# Patient Record
Sex: MALE | Race: WHITE | NOT HISPANIC OR LATINO | Employment: FULL TIME | URBAN - METROPOLITAN AREA
[De-identification: names, ages, dates, MRNs, and addresses within clinical notes are randomized per-mention and may not be internally consistent; named-entity substitution may affect disease eponyms.]

---

## 2019-03-15 ENCOUNTER — TELEPHONE (OUTPATIENT)
Dept: PHYSICAL THERAPY | Facility: CLINIC | Age: 24
End: 2019-03-15

## 2019-03-19 ENCOUNTER — OFFICE VISIT (OUTPATIENT)
Dept: PHYSICAL THERAPY | Facility: CLINIC | Age: 24
End: 2019-03-19
Payer: COMMERCIAL

## 2019-03-19 DIAGNOSIS — R20.2 PARESTHESIA OF THUMB OF LEFT HAND: Primary | ICD-10-CM

## 2019-03-19 PROCEDURE — G8984 CARRY CURRENT STATUS: HCPCS

## 2019-03-19 PROCEDURE — G8985 CARRY GOAL STATUS: HCPCS

## 2019-03-19 PROCEDURE — 97110 THERAPEUTIC EXERCISES: CPT

## 2019-03-19 NOTE — PROGRESS NOTES
PT Evaluation     Today's date: 3/19/19  Patient name: Alisson Yousif  : 1995  MRN: [id number]  Referring provider: Self, Referral  Dx:   Encounter Diagnosis     ICD-10-CM    1  Paresthesia of thumb of left hand R20 2        Start Time: 1605  Stop Time: 1645  Total time in clinic (min): 40 minutes    Assessment  Assessment details: Patient presents today with L thumb numbness that began after bowling 10 consecutive games with a new bevel that he believes did not fit his thumb appropriately  No pain is present, but N/T present at the tip of the thumb and worsens If he squeezes his web spaces  PT palpation noted increase in TTP and hypersensitivty of the distal thumb and the DIP  Reduced strength noted of the EPL and the FPL with MMTing  Reduced  strength below age matched norm which is 110 5 lbs  Wrist ROM WNL but reduced thumb ROM to below WNL for flexion of the DIP and radial abduction  (-) Tinel's sign present as well as (-) froment's sign  Patient was unable to keep his palm flat on table and extend his thumb off of the table indicating weakness of EPL BL  Patient was thoroughly educated on completing desensitization techniques at home and he verbalized understanding  Abnormal functional biomechanical forces noted with pinching with hyperextension of thumb  Patient would benefit from skilled PT in order to reduce hypersensitivity of thumb, reduce N/T present in thumb, address ROM restrictions, and improve strength of BL UE's so he can complete ADLs and household chores requiring his UE's, maintain a strong , improve biomechanical forces with functional tasks , and bowl symptom free  Impairments: abnormal or restricted ROM, activity intolerance, impaired physical strength, lacks appropriate home exercise program, pain with function and poor body mechanics    Symptom irritability: lowUnderstanding of Dx/Px/POC: good  Goals  STGs:  1   Reduce hypersensitivity by 50% to improve ability to use thumb with function  2  Improve  strength by 2-5 lbs to reach WNL for his age group  3  Improve strength of EPL so he can lift his thumb off table   LTG's  1  Improve hypersensitivity by 80% or greater so he can bowl without aggravating symptoms  2  Improve  strength to 110 5 lbs which his his age matched norm  3  Improve ROM of IP flexion to improve his biomechanical forces with pinching     Plan  Patient would benefit from: PT eval and skilled physical therapy  Planned modality interventions: cryotherapy, TENS and thermotherapy: hydrocollator packs  Planned therapy interventions: joint mobilization, manual therapy, neuromuscular re-education, patient education, strengthening, stretching, therapeutic exercise, therapeutic activities, graded motor, graded exercise, home exercise program, functional ROM exercises, flexibility and fine motor coordination training  Frequency: 2x week  Duration in weeks: 6  Plan of Care beginning date: 3/20/2019  Plan of Care expiration date: 5/15/2019  Treatment plan discussed with: patient        Subjective Evaluation    History of Present Illness  Mechanism of injury: Patient reports that 2 5 weeks ago he was bowling and got a new thumb  The bevel on the bowling bowl was too tight, and believes that after playing 10 consecutive games he pinched a nerve  He hasnt bowled in a week & a half  It has gone down but it is still present  The numbness is only located in the tip of the thumb unless he pinches his web space and can feel it into his whole thumn  He reports no pain just NT in the thumb  No Hx of trauma in the hand  He reports that he has tried ice and heat, as well as advil     Pain  Current pain ratin  At best pain ratin  At worst pain ratin  Location: in the tip of the thumb on the L  Quality: needle-like  Relieving factors: ice and heat    Social Support    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)  Hand dominance: left      Diagnostic Tests  No diagnostic tests performed  Treatments  Current treatment: physical therapy  Patient Goals  Patient goals for therapy: increased strength, independence with ADLs/IADLs, return to sport/leisure activities and improved balance  Patient goal: reduce NT in the thumb  Objective     Palpation     Additional Palpation Details  TTP and hypersensitivity at the L distal thumb and IP joint  Active Range of Motion     Left Wrist   Wrist flexion: 60 degrees   Wrist extension: 70 degrees     Right Wrist   Wrist flexion: 60 degrees   Wrist extension: 70 degrees     Left Thumb   Flexion     MP: 50 degrees    DIP: 45 degrees  Palmar Abduction     CMC: 50 degrees  Radial abduction    CMC: 35 degrees    Right Thumb   Flexion     MP: 50    DIP: 40  Palmar Abduction    CMC: 50  Radial Abduction    CMC: 30    Strength/Myotome Testing     Left Wrist/Hand      (2nd hand position)     Trial 1: 100    Right Wrist/Hand      (2nd hand position)     Trial 1: 100    Tests     Left Shoulder   Negative ULTT1, ULTT2, ULTT3 and ULTT4  Right Shoulder   Negative ULTT1, ULTT2, ULTT3 and ULTT4  Left Elbow   Negative Tinel's sign (cubital tunnel)  Right Elbow   Negative Tinel's sign (cubital tunnel)  Left Wrist/Hand   Negative Tinel's sign (medial nerve)  Right Wrist/Hand   Negative Tinel's sign (medial nerve)       Additional Tests Details  EPL: 3-/5 BL  Froment's sign: (-) BL     Swelling     Left Wrist/Hand   Thumb     Proximal: 6 3 cm    Distal: 5 9 cm    Right Wrist/Hand   Thumb     Proximal: 6 3 cm    Distal: 5 9 cm      Flowsheet Rows      Most Recent Value   PT/OT G-Codes   Current Score  65   Projected Score  81   FOTO information reviewed  Yes   Assessment Type  Evaluation   G code set  Carrying, Moving & Handling Objects   Carrying, Moving and Handling Objects Current Status ()  CI   Carrying, Moving and Handling Objects Goal Status ()  CI Precautions: Standard    Daily Treatment Diary     Manual                                                                                   Exercise Diary  3/20/19            Patient education Nerve impingement, HEP: desensitization                                                                                                                                                                                                                                                                          Modalities

## 2019-03-20 ENCOUNTER — OFFICE VISIT (OUTPATIENT)
Dept: PHYSICAL THERAPY | Facility: CLINIC | Age: 24
End: 2019-03-20
Payer: COMMERCIAL

## 2019-03-20 DIAGNOSIS — R20.2 PARESTHESIA OF THUMB OF LEFT HAND: Primary | ICD-10-CM

## 2019-03-20 PROCEDURE — 97110 THERAPEUTIC EXERCISES: CPT

## 2019-03-20 PROCEDURE — 97161 PT EVAL LOW COMPLEX 20 MIN: CPT

## 2019-03-20 PROCEDURE — 97140 MANUAL THERAPY 1/> REGIONS: CPT

## 2019-03-20 NOTE — PROGRESS NOTES
Daily Note     Today's date: 3/20/2019  Patient name: Stanislaw Ahuja  : 1995  MRN: [id number]  Referring provider: Self, Referral  Dx:   Encounter Diagnosis     ICD-10-CM    1  Paresthesia of thumb of left hand R20 2        Start Time:   Stop Time:   Total time in clinic (min): 60 minutes    Subjective: Patient reports that he hasnt noticed changes since beginning de sensitization  Objective: See treatment diary below    Precautions: Standard    Daily Treatment Diary     Manual  3/20/19            STM intrinsics, thenar eminenece, hypothenar eminence, lumbricals, wrist extrinsics            IASTM intrinsics, thenar eminenece, hypothenar eminence, lumbricals, wrist extrinsics, web spaces            Blocking Thumb IP: 2 x 10  Thumb MCP: 2 x 10             Intrinsic Stretching 3 x 30" each digit            Desensitization Gauze material, towel, hard surface                Exercise Diary  3/20/19            Patient education Nerve impingement, HEP, desenstization            Putty: Thumb Flexion 1  X 10             Putty: Thumb adduction  1 x 10            Putty; Thumb Abduction 1 x 10            Putty: Pinch & Pull 2'            Putty: roll and pinch 2'            Towel Scrunches 2'            Thumb Opposition 2 x 10                                                                                                                                                                             Modalities  3/20/19            MHP 8' pre tx to L hand: skin in tact pre and post            Cold Pack 8' post tx to L hand: skin in tact pre and post                           Assessment: Tolerated treatment well  Patient thoroughly educated in completing HEP (see attached doc), which he demonstrated how to complete correctly and safely  He also verbalized understanding  Tolerated exercises well today to improve strength of L UE and promote nerve desensitization and address compressed nerve   Reduction in symptoms noted post tx which reduced NT present compared to arrival       Plan: Continue per plan of care

## 2019-03-25 ENCOUNTER — APPOINTMENT (OUTPATIENT)
Dept: PHYSICAL THERAPY | Facility: CLINIC | Age: 24
End: 2019-03-25
Payer: COMMERCIAL

## 2019-03-27 ENCOUNTER — APPOINTMENT (OUTPATIENT)
Dept: PHYSICAL THERAPY | Facility: CLINIC | Age: 24
End: 2019-03-27
Payer: COMMERCIAL

## 2019-06-19 NOTE — PROGRESS NOTES
6/19/19: Patient did not show up to last 2 PT visits  FD called and LM to re-schedule but patient did not return call to reschedule  Considered self DC at this time

## 2021-07-12 ENCOUNTER — OFFICE VISIT (OUTPATIENT)
Dept: URGENT CARE | Facility: CLINIC | Age: 26
End: 2021-07-12
Payer: COMMERCIAL

## 2021-07-12 VITALS
RESPIRATION RATE: 18 BRPM | OXYGEN SATURATION: 98 % | HEART RATE: 58 BPM | TEMPERATURE: 97.9 F | SYSTOLIC BLOOD PRESSURE: 100 MMHG | WEIGHT: 171.6 LBS | BODY MASS INDEX: 24.57 KG/M2 | HEIGHT: 70 IN | DIASTOLIC BLOOD PRESSURE: 60 MMHG

## 2021-07-12 DIAGNOSIS — L23.7 ALLERGIC CONTACT DERMATITIS DUE TO PLANTS, EXCEPT FOOD: Primary | ICD-10-CM

## 2021-07-12 PROCEDURE — 99203 OFFICE O/P NEW LOW 30 MIN: CPT | Performed by: PHYSICIAN ASSISTANT

## 2021-07-12 RX ORDER — PREDNISONE 10 MG/1
TABLET ORAL
Qty: 21 TABLET | Refills: 0 | Status: SHIPPED | OUTPATIENT
Start: 2021-07-12

## 2021-07-12 NOTE — PATIENT INSTRUCTIONS
Diffuse rash consistent with poison ivy  Prescribed triamcinolone 1% ointment  Can apply to the rash area 2x a day for 14 days  Avoid putting ointment on sensitive areas such as the face, groin and armpits  Also, prescribed oral prednisone  Take as directed  Follow up with primary care doctor if symptoms worsen

## 2021-07-12 NOTE — PROGRESS NOTES
[facility]        NAME: Alverto Yuen is a 27 y o  male  : 1995    MRN: [id number]  DATE: 2021  TIME: 1:19 PM    Assessment and Plan   Allergic contact dermatitis due to plants, except food [L23 7]  1  Allergic contact dermatitis due to plants, except food  triamcinolone (KENALOG) 0 1 % ointment    predniSONE 10 mg tablet         Patient Instructions     Patient Instructions   Diffuse rash consistent with poison ivy  Prescribed triamcinolone 1% ointment  Can apply to the rash area 2x a day for 14 days  Avoid putting ointment on sensitive areas such as the face, groin and armpits  Also, prescribed oral prednisone  Take as directed  Follow up with primary care doctor if symptoms worsen  Follow up with PCP in 3-5 days  Proceed to  ER if symptoms worsen  Chief Complaint     Chief Complaint   Patient presents with   [address]     Exposed to poison ivy and sumac on Saturday - large areas on arms and legs with few spots on abdoman and R face  Tried Tech-Nu, Calamine, anti-itch spray and Benadryl  History of Present Illness       26 y/o male presents with poison ivy rash dispersed diffusely on his arms, legs, and a small spot on her right cheek x2 days  Pt notes trees covered in poison fell on his parent's power lines so he had to help move them  He has tried [employer], Calamine, anti-itch spray and benadryl with out much relief  Denies any trouble breathing, SOB, tongue/lip or throat swelling  Review of Systems   Review of Systems   Constitutional: Negative for fever  HENT: Negative for facial swelling, sore throat and trouble swallowing  Respiratory: Negative for cough, chest tightness and shortness of breath  Cardiovascular: Negative for chest pain  Skin: Positive for rash  Neurological: Negative for headaches           Current Medications       Current Outpatient Medications:     predniSONE 10 mg tablet, Take 6 tabs x1 day, 5 tabs x1 day, 4 tabs x1 day, 3 tabs x1 day, 2 tabs x1 day and 1 tab x1 day, Disp: 21 tablet, Rfl: 0    triamcinolone (KENALOG) 0 1 % ointment, Apply topically 2 (two) times a day for 14 days, Disp: 80 g, Rfl: 0    Current Allergies     Allergies as of 07/12/2021    (No Known Allergies)            The following portions of the patient's history were reviewed and updated as appropriate: allergies, current medications, past family history, past medical history, past social history, past surgical history and problem list      Past Medical History:   Diagnosis Date    Allergic rhinitis     IBS (irritable bowel syndrome)        Past Surgical History:   Procedure Laterality Date    NO PAST SURGERIES         Family History   Problem Relation Age of Onset    Diabetes Father     Cancer Maternal Grandmother     Cancer Paternal Grandfather          Medications have been verified  Objective   /60   Pulse 58   Temp 97 9 °F (36 6 °C)   Resp 18   Ht 5' 10" (1 778 m)   Wt 77 8 kg (171 lb 9 6 oz)   SpO2 98%   BMI 24 62 kg/m²        Physical Exam     Physical Exam  Vitals and nursing note reviewed  Constitutional:       Appearance: Normal appearance  HENT:      Head: Normocephalic and atraumatic  Mouth/Throat:      Mouth: Mucous membranes are moist       Pharynx: Oropharynx is clear  Eyes:      Extraocular Movements: Extraocular movements intact  Pupils: Pupils are equal, round, and reactive to light  Cardiovascular:      Rate and Rhythm: Normal rate and regular rhythm  Pulses: Normal pulses  Heart sounds: Normal heart sounds  Skin:     Findings: Erythema and rash (vesicular linear rash noted scattered b/l arms and legs) present  Neurological:      Mental Status: He is alert and oriented to person, place, and time

## 2023-08-23 ENCOUNTER — TELEPHONE (OUTPATIENT)
Age: 28
End: 2023-08-23

## 2023-08-23 ENCOUNTER — APPOINTMENT (EMERGENCY)
Dept: RADIOLOGY | Facility: HOSPITAL | Age: 28
End: 2023-08-23
Payer: COMMERCIAL

## 2023-08-23 ENCOUNTER — HOSPITAL ENCOUNTER (EMERGENCY)
Facility: HOSPITAL | Age: 28
Discharge: HOME | End: 2023-08-23
Attending: EMERGENCY MEDICINE
Payer: COMMERCIAL

## 2023-08-23 VITALS
BODY MASS INDEX: 24.14 KG/M2 | OXYGEN SATURATION: 98 % | DIASTOLIC BLOOD PRESSURE: 61 MMHG | WEIGHT: 168.21 LBS | HEART RATE: 67 BPM | SYSTOLIC BLOOD PRESSURE: 133 MMHG | RESPIRATION RATE: 16 BRPM | TEMPERATURE: 98.3 F

## 2023-08-23 DIAGNOSIS — K64.4 EXTERNAL HEMORRHOID: ICD-10-CM

## 2023-08-23 DIAGNOSIS — R33.8 ACUTE URINARY RETENTION: Primary | ICD-10-CM

## 2023-08-23 DIAGNOSIS — K59.00 CONSTIPATION: ICD-10-CM

## 2023-08-23 LAB
ALBUMIN SERPL BCP-MCNC: 4.5 G/DL (ref 3.5–5)
ALP SERPL-CCNC: 58 U/L (ref 34–104)
ALT SERPL W P-5'-P-CCNC: 34 U/L (ref 7–52)
ANION GAP SERPL CALCULATED.3IONS-SCNC: 6 MMOL/L
AST SERPL W P-5'-P-CCNC: 44 U/L (ref 13–39)
BASOPHILS # BLD AUTO: 0.05 THOUSANDS/ÂΜL (ref 0–0.1)
BASOPHILS NFR BLD AUTO: 1 % (ref 0–1)
BILIRUB SERPL-MCNC: 1.43 MG/DL (ref 0.2–1)
BILIRUB UR QL STRIP: NEGATIVE
BUN SERPL-MCNC: 22 MG/DL (ref 5–25)
CALCIUM SERPL-MCNC: 9.4 MG/DL (ref 8.4–10.2)
CHLORIDE SERPL-SCNC: 104 MMOL/L (ref 96–108)
CLARITY UR: CLEAR
CO2 SERPL-SCNC: 28 MMOL/L (ref 21–32)
COLOR UR: NORMAL
CREAT SERPL-MCNC: 1 MG/DL (ref 0.6–1.3)
EOSINOPHIL # BLD AUTO: 0.18 THOUSAND/ÂΜL (ref 0–0.61)
EOSINOPHIL NFR BLD AUTO: 2 % (ref 0–6)
ERYTHROCYTE [DISTWIDTH] IN BLOOD BY AUTOMATED COUNT: 12.5 % (ref 11.6–15.1)
GFR SERPL CREATININE-BSD FRML MDRD: 100 ML/MIN/1.73SQ M
GLUCOSE SERPL-MCNC: 88 MG/DL (ref 65–140)
GLUCOSE UR STRIP-MCNC: NEGATIVE MG/DL
HCT VFR BLD AUTO: 44.2 % (ref 36.5–49.3)
HGB BLD-MCNC: 15.1 G/DL (ref 12–17)
HGB UR QL STRIP.AUTO: NEGATIVE
IMM GRANULOCYTES # BLD AUTO: 0.02 THOUSAND/UL (ref 0–0.2)
IMM GRANULOCYTES NFR BLD AUTO: 0 % (ref 0–2)
KETONES UR STRIP-MCNC: NEGATIVE MG/DL
LEUKOCYTE ESTERASE UR QL STRIP: NEGATIVE
LYMPHOCYTES # BLD AUTO: 1.64 THOUSANDS/ÂΜL (ref 0.6–4.47)
LYMPHOCYTES NFR BLD AUTO: 21 % (ref 14–44)
MCH RBC QN AUTO: 32.8 PG (ref 26.8–34.3)
MCHC RBC AUTO-ENTMCNC: 34.2 G/DL (ref 31.4–37.4)
MCV RBC AUTO: 96 FL (ref 82–98)
MONOCYTES # BLD AUTO: 0.65 THOUSAND/ÂΜL (ref 0.17–1.22)
MONOCYTES NFR BLD AUTO: 8 % (ref 4–12)
NEUTROPHILS # BLD AUTO: 5.4 THOUSANDS/ÂΜL (ref 1.85–7.62)
NEUTS SEG NFR BLD AUTO: 68 % (ref 43–75)
NITRITE UR QL STRIP: NEGATIVE
NRBC BLD AUTO-RTO: 0 /100 WBCS
PH UR STRIP.AUTO: 6 [PH]
PLATELET # BLD AUTO: 225 THOUSANDS/UL (ref 149–390)
PMV BLD AUTO: 9.4 FL (ref 8.9–12.7)
POTASSIUM SERPL-SCNC: 3.9 MMOL/L (ref 3.5–5.3)
PROT SERPL-MCNC: 7.2 G/DL (ref 6.4–8.4)
PROT UR STRIP-MCNC: NEGATIVE MG/DL
RBC # BLD AUTO: 4.61 MILLION/UL (ref 3.88–5.62)
SODIUM SERPL-SCNC: 138 MMOL/L (ref 135–147)
SP GR UR STRIP.AUTO: 1.02 (ref 1–1.03)
UROBILINOGEN UR QL STRIP.AUTO: 0.2 E.U./DL
WBC # BLD AUTO: 7.94 THOUSAND/UL (ref 4.31–10.16)

## 2023-08-23 PROCEDURE — 87591 N.GONORRHOEAE DNA AMP PROB: CPT | Performed by: EMERGENCY MEDICINE

## 2023-08-23 PROCEDURE — 51702 INSERT TEMP BLADDER CATH: CPT | Performed by: EMERGENCY MEDICINE

## 2023-08-23 PROCEDURE — 81003 URINALYSIS AUTO W/O SCOPE: CPT | Performed by: EMERGENCY MEDICINE

## 2023-08-23 PROCEDURE — 74018 RADEX ABDOMEN 1 VIEW: CPT

## 2023-08-23 PROCEDURE — 87491 CHLMYD TRACH DNA AMP PROBE: CPT | Performed by: EMERGENCY MEDICINE

## 2023-08-23 PROCEDURE — 80053 COMPREHEN METABOLIC PANEL: CPT | Performed by: EMERGENCY MEDICINE

## 2023-08-23 PROCEDURE — 99284 EMERGENCY DEPT VISIT MOD MDM: CPT | Performed by: EMERGENCY MEDICINE

## 2023-08-23 PROCEDURE — 36415 COLL VENOUS BLD VENIPUNCTURE: CPT | Performed by: EMERGENCY MEDICINE

## 2023-08-23 PROCEDURE — 99283 EMERGENCY DEPT VISIT LOW MDM: CPT

## 2023-08-23 PROCEDURE — 85025 COMPLETE CBC W/AUTO DIFF WBC: CPT | Performed by: EMERGENCY MEDICINE

## 2023-08-23 RX ORDER — HYDROCORTISONE ACETATE 25 MG/1
25 SUPPOSITORY RECTAL 2 TIMES DAILY
Qty: 12 SUPPOSITORY | Refills: 0 | Status: SHIPPED | OUTPATIENT
Start: 2023-08-23

## 2023-08-23 RX ORDER — DOCUSATE SODIUM 100 MG/1
100 CAPSULE, LIQUID FILLED ORAL 2 TIMES DAILY
Qty: 40 CAPSULE | Refills: 0 | Status: SHIPPED | OUTPATIENT
Start: 2023-08-23 | End: 2023-09-12

## 2023-08-23 RX ORDER — POLYETHYLENE GLYCOL 3350 17 G/17G
17 POWDER, FOR SOLUTION ORAL 2 TIMES DAILY
Qty: 102 G | Refills: 0 | Status: SHIPPED | OUTPATIENT
Start: 2023-08-23 | End: 2023-08-26

## 2023-08-23 NOTE — TELEPHONE ENCOUNTER
New Patient    What is the reason for the patient’s appointment?:Patient called stating he is having frequency and he feels his bladder is not emptying. Patient is going more often. Started a few weeks ago. He has slight burning sometimes. What office location does the patient prefer?:[place].      Does patient have Imaging/Lab Results:    Have patient records been requested?:  If No, are the records showing in Epic:       INSURANCE:   Do we accept the patient's insurance or is the patient Self-Pay?:    Insurance Provider:[employer]  Plan Type/Number:   Member ID#:       HISTORY:   Has the patient had any previous Urologist(s)?:no     Was the patient seen in the ED?:no     Has the patient had any outside testing done?:no     Does the patient have a personal history of cancer?:no.

## 2023-08-23 NOTE — ED PROVIDER NOTES
History  Chief Complaint   Patient presents with   • Difficulty Urinating     States for past week and a half. Feeling like he has to force urine out. Some discomfort before urinating. Also c/o poss internal hemerrrhoids and stools have been thin, unsure if it is connected     Patient is a 56-year-old male that presents to the emergency department for evaluation of dysuria/urinary frequency and retention. Patient states that he has had difficulty with urination for the past 1.5wks. He also had 15 voiding episodes yesterday. Patient states that he is in a monogamous relationship with his wife and is not concerned about STDs. History provided by:  Patient   used: No    Difficulty Urinating  Presenting symptoms: dysuria    Associated symptoms: urinary frequency    Associated symptoms: no abdominal pain, no diarrhea, no fever, no flank pain, no hematuria, no nausea and no vomiting        Prior to Admission Medications   Prescriptions Last Dose Informant Patient Reported? Taking?   predniSONE 10 mg tablet Not Taking  No No   Sig: Take 6 tabs x1 day, 5 tabs x1 day, 4 tabs x1 day, 3 tabs x1 day, 2 tabs x1 day and 1 tab x1 day   Patient not taking: Reported on 8/23/2023   triamcinolone (KENALOG) 0.1 % ointment   No No   Sig: Apply topically 2 (two) times a day for 14 days      Facility-Administered Medications: None       Past Medical History:   Diagnosis Date   • Allergic rhinitis    • IBS (irritable bowel syndrome)        Past Surgical History:   Procedure Laterality Date   • NO PAST SURGERIES         Family History   Problem Relation Age of Onset   • Diabetes Father    • Cancer Maternal Grandmother    • Cancer Paternal Grandfather      I have reviewed and agree with the history as documented.     E-Cigarette/Vaping   • E-Cigarette Use Current Every Day User      E-Cigarette/Vaping Substances     Social History     Tobacco Use   • Smoking status: Former     Packs/day: 0.25     Years: 3.00 Total pack years: 0.75     Types: E-Cigarettes, Cigarettes     Quit date: 2020     Years since quitting: 3.6   • Smokeless tobacco: Never   Vaping Use   • Vaping Use: Every day   Substance Use Topics   • Alcohol use: Yes     Comment: social   • Drug use: Yes     Types: Marijuana       Review of Systems   Constitutional: Negative for chills and fever. Respiratory: Negative for cough, shortness of breath and wheezing. Cardiovascular: Negative for chest pain and palpitations. Gastrointestinal: Negative for abdominal pain, constipation, diarrhea, nausea and vomiting. Genitourinary: Positive for difficulty urinating, dysuria and frequency. Negative for flank pain, hematuria and urgency. Musculoskeletal: Negative for back pain. Skin: Negative for color change and rash. All other systems reviewed and are negative. Physical Exam  Physical Exam  Vitals and nursing note reviewed. Constitutional:       Appearance: He is well-developed. HENT:      Head: Normocephalic and atraumatic. Eyes:      Pupils: Pupils are equal, round, and reactive to light. Cardiovascular:      Rate and Rhythm: Normal rate and regular rhythm. Heart sounds: Normal heart sounds. Pulmonary:      Effort: Pulmonary effort is normal.      Breath sounds: Normal breath sounds. Abdominal:      General: Bowel sounds are normal. There is no distension. Palpations: Abdomen is soft. There is no mass. Tenderness: There is no abdominal tenderness. There is no guarding or rebound. Genitourinary:     Prostate: Normal. Not tender. Rectum: External hemorrhoid present. Musculoskeletal:      Cervical back: Normal range of motion and neck supple. Skin:     General: Skin is warm and dry. Capillary Refill: Capillary refill takes less than 2 seconds. Neurological:      General: No focal deficit present. Mental Status: He is alert and oriented to person, place, and time.    Psychiatric:         Behavior: Behavior normal.         Thought Content: Thought content normal.         Judgment: Judgment normal.         Vital Signs  ED Triage Vitals [08/23/23 1541]   Temperature Pulse Respirations Blood Pressure SpO2   98.3 °F (36.8 °C) 67 16 133/61 98 %      Temp Source Heart Rate Source Patient Position - Orthostatic VS BP Location FiO2 (%)   Tympanic Monitor Sitting Right arm --      Pain Score       No Pain           Vitals:    08/23/23 1541   BP: 133/61   Pulse: 67   Patient Position - Orthostatic VS: Sitting         Visual Acuity      ED Medications  Medications - No data to display    Diagnostic Studies  Results Reviewed     Procedure Component Value Units Date/Time    UA w Reflex to Microscopic w Reflex to Culture [[id number]] Collected: 08/23/23 1732    Lab Status: Final result Specimen: Urine, Indwelling Santos Catheter Updated: 08/23/23 1803     Color, UA Light Yellow     Clarity, UA Clear     Specific Gravity, UA 1.025     pH, UA 6.0     Leukocytes, UA Negative     Nitrite, UA Negative     Protein, UA Negative mg/dl      Glucose, UA Negative mg/dl      Ketones, UA Negative mg/dl      Urobilinogen, UA 0.2 E.U./dl      Bilirubin, UA Negative     Occult Blood, UA Negative    Chlamydia/GC amplified DNA by PCR [[id number]] Collected: 08/23/23 1732    Lab Status:  In process Specimen: Urine, Other Updated: 08/23/23 1759    Comprehensive metabolic panel [[id number]]  (Abnormal) Collected: 08/23/23 1728    Lab Status: Final result Specimen: Blood from Arm, Right Updated: 08/23/23 1750     Sodium 138 mmol/L      Potassium 3.9 mmol/L      Chloride 104 mmol/L      CO2 28 mmol/L      ANION GAP 6 mmol/L      BUN 22 mg/dL      Creatinine 1.00 mg/dL      Glucose 88 mg/dL      Calcium 9.4 mg/dL      AST 44 U/L      ALT 34 U/L      Alkaline Phosphatase 58 U/L      Total Protein 7.2 g/dL      Albumin 4.5 g/dL      Total Bilirubin 1.43 mg/dL      eGFR 100 ml/min/1.73sq m     Narrative:      [facility] for [facility] Kidney Disease (CKD):   •  Stage 1 with normal or high GFR (GFR > 90 mL/min/1.73 square meters)  •  Stage 2 Mild CKD (GFR = 60-89 mL/min/1.73 square meters)  •  Stage 3A Moderate CKD (GFR = 45-59 mL/min/1.73 square meters)  •  Stage 3B Moderate CKD (GFR = 30-44 mL/min/1.73 square meters)  •  Stage 4 Severe CKD (GFR = 15-29 mL/min/1.73 square meters)  •  Stage 5 End Stage CKD (GFR <15 mL/min/1.73 square meters)  Note: GFR calculation is accurate only with a steady state creatinine    CBC and differential [[id number]] Collected: 08/23/23 1728    Lab Status: Final result Specimen: Blood from Arm, Right Updated: 08/23/23 1734     WBC 7.94 Thousand/uL      RBC 4.61 Million/uL      Hemoglobin 15.1 g/dL      Hematocrit 44.2 %      MCV 96 fL      MCH 32.8 pg      MCHC 34.2 g/dL      RDW 12.5 %      MPV 9.4 fL      Platelets 974 Thousands/uL      nRBC 0 /100 WBCs      Neutrophils Relative 68 %      Immat GRANS % 0 %      Lymphocytes Relative 21 %      Monocytes Relative 8 %      Eosinophils Relative 2 %      Basophils Relative 1 %      Neutrophils Absolute 5.40 Thousands/µL      Immature Grans Absolute 0.02 Thousand/uL      Lymphocytes Absolute 1.64 Thousands/µL      Monocytes Absolute 0.65 Thousand/µL      Eosinophils Absolute 0.18 Thousand/µL      Basophils Absolute 0.05 Thousands/µL                  XR abdomen 1 view kub   ED Interpretation by Alyssa aDugherty DO (08/23 1817)   Moderate fecal retention                   Procedures  Procedures         ED Course  ED Course as of 08/24/23 0146   Wed Aug 23, 2023   1824 I offered patient to discharge to home with Santos catheter in place, however he states that it is quite uncomfortable and would prefer to have it removed at this time. Patient agrees that he is constipated, will attempt MiraLAX for relief and monitor for recurrent urinary retention. Patient has an appointment [facility] on 9/7. He will call in the morning for a sooner appointment. Return precautions reviewed with the patient. Medical Decision Making  78-year-old male in ED with acute urinary retention. Patient with greater than 400 cc of urine in his bladder per bladder scan. Santos catheter placed in the ED. Labs, urinalysis ordered and reviewed. KUB ordered and reviewed -consistent with moderate fecal retention, likely cause of urinary retention. Patient will be discharged to home with MiraLAX, Colace, Anusol HC and outpatient follow-up with urology. Patient opts to have Santos catheter discontinued prior to discharge. Amount and/or Complexity of Data Reviewed  Labs: ordered. Radiology: ordered and independent interpretation performed. Risk  OTC drugs. Prescription drug management. Disposition  Final diagnoses:   Acute urinary retention   Constipation   External hemorrhoid     Time reflects when diagnosis was documented in both MDM as applicable and the Disposition within this note     Time User Action Codes Description Comment    8/23/2023  6:25 PM Lukas Reagin O Add [R33.8] Acute urinary retention     8/23/2023  6:25 PM Lukas Reagin Add [K59.00] Constipation     8/23/2023  6:28 PM Lukas Reagin Add [K64.4] External hemorrhoid       ED Disposition     ED Disposition   Discharge    Condition   Stable    Date/Time   Wed Aug 23, 2023  6:25 PM    Comment   [employer] discharge to home/self care.                Follow-up Information     Follow up With Specialties Details Why Contact Info Additional Information    Enrique Nunes MD Urology Schedule an appointment as soon as possible for a visit in 1 day for follow up [address]  [address] [facility] Schedule an appointment as soon as possible for a visit in 1 day for follow up [address] [facility] [id number]  [facility] Naveed Khalil, [address] [facility], [address] [place] [address], [phone], [phone]          Discharge Medication List as of 8/23/2023  6:30 PM      START taking these medications    Details   docusate sodium (COLACE) 100 mg capsule Take 1 capsule (100 mg total) by mouth 2 (two) times a day for 20 days, Starting Wed 8/23/2023, Until Tue 9/12/2023, Normal      hydrocortisone (ANUSOL-HC) 25 mg suppository Insert 1 suppository (25 mg total) into the rectum 2 (two) times a day, Starting Wed 8/23/2023, Normal      polyethylene glycol (MIRALAX) 17 g packet Take 17 g by mouth 2 (two) times a day for 3 days, Starting Wed 8/23/2023, Until Sat 8/26/2023, Normal         CONTINUE these medications which have NOT CHANGED    Details   predniSONE 10 mg tablet Take 6 tabs x1 day, 5 tabs x1 day, 4 tabs x1 day, 3 tabs x1 day, 2 tabs x1 day and 1 tab x1 day, Normal      triamcinolone (KENALOG) 0.1 % ointment Apply topically 2 (two) times a day for 14 days, Starting Mon 7/12/2021, Until Mon 7/26/2021, Normal             No discharge procedures on file.     PDMP Review     None          ED Provider  Electronically Signed by           Hernán Patton DO  08/24/23 0704

## 2023-08-23 NOTE — DISCHARGE INSTRUCTIONS
You have opted to have the Santos catheter removed at the end of this emergency department visit, despite the chance of recurrence of urinary retention  You may return to emergency department at any time for reinsertion  Take medication as prescribed  Return to the ER for further concerns or worsening symptoms  Follow up with your primary care physician and urology in 1-2 days

## 2023-08-23 NOTE — ED NOTES
Pre-voiding bladder scan completed for greater than 470cc. Dr. Cowart aware. Pt given urinal to void, door shut for privacy.        Yandy Gongora RN  08/23/23 4693

## 2023-08-24 LAB
C TRACH DNA SPEC QL NAA+PROBE: NEGATIVE
N GONORRHOEA DNA SPEC QL NAA+PROBE: NEGATIVE